# Patient Record
Sex: FEMALE | Race: WHITE | HISPANIC OR LATINO | Employment: UNEMPLOYED | URBAN - METROPOLITAN AREA
[De-identification: names, ages, dates, MRNs, and addresses within clinical notes are randomized per-mention and may not be internally consistent; named-entity substitution may affect disease eponyms.]

---

## 2017-09-27 ENCOUNTER — OFFICE VISIT (OUTPATIENT)
Dept: FAMILY MEDICINE | Facility: HOSPITAL | Age: 37
End: 2017-09-27
Attending: FAMILY MEDICINE

## 2017-09-27 VITALS
WEIGHT: 209.19 LBS | BODY MASS INDEX: 35.71 KG/M2 | HEART RATE: 90 BPM | HEIGHT: 64 IN | DIASTOLIC BLOOD PRESSURE: 92 MMHG | SYSTOLIC BLOOD PRESSURE: 162 MMHG

## 2017-09-27 DIAGNOSIS — I10 ESSENTIAL HYPERTENSION: ICD-10-CM

## 2017-09-27 DIAGNOSIS — Z30.09 COUNSELING FOR BIRTH CONTROL, ORAL CONTRACEPTIVES: Primary | ICD-10-CM

## 2017-09-27 PROCEDURE — 99213 OFFICE O/P EST LOW 20 MIN: CPT | Performed by: FAMILY MEDICINE

## 2017-09-27 RX ORDER — FOLIC ACID 1 MG/1
1 TABLET ORAL DAILY
Qty: 90 TABLET | Refills: 3 | Status: SHIPPED | OUTPATIENT
Start: 2017-09-27 | End: 2017-11-21

## 2017-09-27 RX ORDER — NORGESTIMATE AND ETHINYL ESTRADIOL 7DAYSX3 LO
1 KIT ORAL DAILY
Qty: 90 TABLET | Refills: 3 | Status: SHIPPED | OUTPATIENT
Start: 2017-09-27 | End: 2017-10-12 | Stop reason: SDUPTHER

## 2017-09-27 RX ORDER — LISINOPRIL 10 MG/1
10 TABLET ORAL DAILY
Qty: 30 TABLET | Refills: 0 | Status: SHIPPED | OUTPATIENT
Start: 2017-09-27 | End: 2017-10-12 | Stop reason: DRUGHIGH

## 2017-09-27 RX ORDER — LOSARTAN POTASSIUM 25 MG/1
25 TABLET ORAL DAILY
COMMUNITY
End: 2017-09-27

## 2017-09-27 NOTE — PROGRESS NOTES
I assume primary medical responsibility for this patient, I have reviewed the case history, findings, diagnosis and treatment plan with the resident and agree that the care is reasonable and necessary. This service has been performed by a resident without the presence of a teaching physician under the primary care exception  Ladonna Espinal  9/27/2017

## 2017-09-27 NOTE — PROGRESS NOTES
Subjective:       Patient ID: Jane Murphy is a 36 y.o. female.    Chief Complaint: Medication Refill    HPI   35 yo female, w/ h/o HTN, presents for medication refill. Patient was switch from lisinopril to losartan by DOC. She states she did not like losartan and would like to get switched back. Her BP is 162/92 today. She is complaining about a HA. She states she gets HA whenever her BP is elevated. Denies sob, cp, diaphoresis, or blurriness of vision. She states she would like to get OCPs to prevent anymore pregnancy. She has one sexual partner. States she does have mild nausea.     Review of Systems   Constitutional: Negative for chills and fever.   HENT: Negative for congestion.    Respiratory: Negative for shortness of breath and wheezing.    Cardiovascular: Negative for chest pain, palpitations and leg swelling.   Gastrointestinal: Negative for abdominal pain, constipation, diarrhea, nausea and vomiting.   Genitourinary: Negative for dysuria.   Neurological: Negative for dizziness and headaches.       Objective:      Vitals:    09/27/17 1512   BP: (!) 162/92   Pulse: 90     Physical Exam   Constitutional: She is oriented to person, place, and time. No distress.   HENT:   Head: Normocephalic and atraumatic.   TTP frontal area. Has ttp on L side of her neck   Eyes: Conjunctivae are normal.   Neck: Neck supple. No JVD present.   Cardiovascular: Normal rate, regular rhythm and normal heart sounds.  Exam reveals no gallop and no friction rub.    No murmur heard.  Pulmonary/Chest: Effort normal and breath sounds normal. She has no wheezes. She has no rales.   Abdominal: Soft. Bowel sounds are normal. She exhibits no distension. There is no tenderness.   Musculoskeletal: She exhibits no edema.   Neurological: She is alert and oriented to person, place, and time.   Skin: Skin is warm and dry.   Psychiatric: She has a normal mood and affect. Her behavior is normal. Judgment and thought content normal.        Assessment:       1. Counseling for birth control, oral contraceptives    2. Essential hypertension        Plan:       Counseling for birth control, oral contraceptives  -     norgestimate-ethinyl estradiol (ORTHO TRI-CYCLEN LO) 0.18/0.215/0.25 mg-25 mcg tablet; Take 1 tablet by mouth once daily.  Dispense: 90 tablet; Refill: 3  -     folic acid (FOLVITE) 1 MG tablet; Take 1 tablet (1 mg total) by mouth once daily.  Dispense: 90 tablet; Refill: 3    Essential hypertension  - /92  -     lisinopril 10 MG tablet; Take 1 tablet (10 mg total) by mouth once daily.  Dispense: 30 tablet; Refill: 0      RTC in 2 weeks

## 2017-10-12 ENCOUNTER — OFFICE VISIT (OUTPATIENT)
Dept: FAMILY MEDICINE | Facility: HOSPITAL | Age: 37
End: 2017-10-12
Attending: FAMILY MEDICINE

## 2017-10-12 VITALS
WEIGHT: 209.19 LBS | DIASTOLIC BLOOD PRESSURE: 85 MMHG | HEIGHT: 64 IN | BODY MASS INDEX: 35.71 KG/M2 | HEART RATE: 113 BPM | SYSTOLIC BLOOD PRESSURE: 148 MMHG

## 2017-10-12 DIAGNOSIS — F41.9 ANXIETY: ICD-10-CM

## 2017-10-12 DIAGNOSIS — Z30.09 COUNSELING FOR BIRTH CONTROL, ORAL CONTRACEPTIVES: ICD-10-CM

## 2017-10-12 DIAGNOSIS — I10 ESSENTIAL HYPERTENSION: Primary | ICD-10-CM

## 2017-10-12 PROCEDURE — 99213 OFFICE O/P EST LOW 20 MIN: CPT

## 2017-10-12 RX ORDER — NORGESTIMATE AND ETHINYL ESTRADIOL 7DAYSX3 LO
1 KIT ORAL DAILY
Qty: 30 TABLET | Refills: 6 | Status: SHIPPED | OUTPATIENT
Start: 2017-10-12 | End: 2018-10-12

## 2017-10-12 RX ORDER — FLUOXETINE 10 MG/1
10 TABLET ORAL DAILY
Qty: 30 TABLET | Refills: 3 | Status: SHIPPED | OUTPATIENT
Start: 2017-10-12 | End: 2017-11-21

## 2017-10-12 RX ORDER — LISINOPRIL 20 MG/1
20 TABLET ORAL DAILY
Qty: 30 TABLET | Refills: 3 | Status: SHIPPED | OUTPATIENT
Start: 2017-10-12 | End: 2018-01-22 | Stop reason: SDUPTHER

## 2017-10-12 RX ORDER — HYDROXYZINE HYDROCHLORIDE 25 MG/1
25 TABLET, FILM COATED ORAL 3 TIMES DAILY PRN
Qty: 30 TABLET | Refills: 0 | Status: SHIPPED | OUTPATIENT
Start: 2017-10-12

## 2017-10-12 NOTE — PROGRESS NOTES
Subjective:       Patient ID: Jane Murphy is a 36 y.o. female.    Chief Complaint: Follow-up and Hypertension    HPI   37 y/o F with a PMH of HTN is here for f/u on HTN. Reports she checks her bp once or twice a week at [facility] and systolic usually in the 150's 160's.   Currently denies headache, dizziness, facial pain.  She does complain of anxiety and has a h/o depression treated back in [place].  Denies suicidal or homicidal ideations.  Does not remember what medication she took for depression in the past.       Review of Systems   Constitutional: Negative for chills and fever.   HENT: Negative for congestion.    Respiratory: Negative for apnea and chest tightness.    Cardiovascular: Positive for palpitations. Negative for chest pain.   Gastrointestinal: Negative for abdominal distention and abdominal pain.   Musculoskeletal: Negative for arthralgias.   Neurological: Negative for light-headedness and numbness.   Psychiatric/Behavioral: Negative for agitation and behavioral problems.       Objective:      Vitals:    10/12/17 1340   BP: (!) 148/85   Pulse: (!) 113     Physical Exam   Constitutional: She appears well-nourished.   Eyes: EOM are normal.   Neck: Normal range of motion.   Cardiovascular: Regular rhythm.    Tachycardic    Pulmonary/Chest: Effort normal and breath sounds normal.   Musculoskeletal: Normal range of motion.   Neurological: No cranial nerve deficit. Coordination normal.   Skin: Skin is warm.       Assessment:       1. Essential hypertension    2. Anxiety    3. Counseling for birth control, oral contraceptives        Plan:         Will increase bp medication.  She is tachycardic but I believe this is part of her anxiety.  Will start anxiety meds and reassess in 1 month.     Essential hypertension  -     lisinopril (PRINIVIL,ZESTRIL) 20 MG tablet; Take 1 tablet (20 mg total) by mouth once daily.  Dispense: 30 tablet; Refill: 3    Anxiety  -     fluoxetine 10 MG Tab; Take 1  tablet (10 mg total) by mouth once daily.  Dispense: 30 tablet; Refill: 3  -     hydrOXYzine HCl (ATARAX) 25 MG tablet; Take 1 tablet (25 mg total) by mouth 3 (three) times daily as needed for Anxiety.  Dispense: 30 tablet; Refill: 0    Counseling for birth control, oral contraceptives  -     norgestimate-ethinyl estradiol (ORTHO TRI-CYCLEN LO) 0.18/0.215/0.25 mg-25 mcg tablet; Take 1 tablet by mouth once daily.  Dispense: 30 tablet; Refill: 6      f/u in 1 month

## 2017-11-10 ENCOUNTER — OFFICE VISIT (OUTPATIENT)
Dept: FAMILY MEDICINE | Facility: HOSPITAL | Age: 37
End: 2017-11-10

## 2017-11-10 VITALS
WEIGHT: 209.44 LBS | DIASTOLIC BLOOD PRESSURE: 83 MMHG | HEART RATE: 94 BPM | SYSTOLIC BLOOD PRESSURE: 132 MMHG | HEIGHT: 64 IN | BODY MASS INDEX: 35.76 KG/M2

## 2017-11-10 DIAGNOSIS — I10 ESSENTIAL HYPERTENSION: Primary | ICD-10-CM

## 2017-11-10 PROCEDURE — 99213 OFFICE O/P EST LOW 20 MIN: CPT

## 2017-11-13 PROBLEM — F41.9 ANXIETY: Status: RESOLVED | Noted: 2017-10-12 | Resolved: 2017-11-13

## 2017-11-13 NOTE — PROGRESS NOTES
I assume primary medical responsibility for this patient, I have reviewed the case history, findings, diagnosis and treatment plan with the resident and agree that the care is reasonable and necessary. This service has been performed by a resident without the presence of a teaching physician under the primary care exception  Ladonna Espinal  11/13/2017

## 2017-11-13 NOTE — PROGRESS NOTES
"Subjective:       Patient ID: Jane Murphy is a 37 y.o. female.    Chief Complaint: Follow-up    HPI   36 y/o F with a PMH HTN is here for a f/u on her anxiety.  Previous visit, patient reported anxiety symptoms and was prescribed atarax PRN and fluoxetine.  Patient reports she took 1 pill of fluoxetine and didn't like how she felt "somnolent."  Reports she has not had anxiety or depression symptoms.  Denies palpitations, nervous, insomnia.  Reports since she's been on her bp medications, her anxiety has resolved.      Review of Systems  as above  Objective:      Vitals:    11/10/17 1053   BP: 132/83   Pulse: 94     Physical Exam   Constitutional: She appears well-nourished. No distress.   Obese female    HENT:   Head: Atraumatic.   Eyes: EOM are normal.   Neck: Normal range of motion.   Cardiovascular: Normal rate.    Pulmonary/Chest: Effort normal.   Abdominal: Soft. Bowel sounds are normal.   Musculoskeletal: Normal range of motion.   Neurological: She is alert.   Skin: Skin is warm.       Assessment:       1. Essential hypertension        Plan:       Essential hypertension  -continue lisinopril    D/c fluoxetine and atarax as patient reports she's feeling much better    Return if symptoms worsen or fail to improve.      "

## 2017-11-20 ENCOUNTER — HOSPITAL ENCOUNTER (EMERGENCY)
Facility: HOSPITAL | Age: 37
Discharge: HOME | End: 2017-11-20
Attending: EMERGENCY MEDICINE

## 2017-11-20 VITALS
BODY MASS INDEX: 41.03 KG/M2 | DIASTOLIC BLOOD PRESSURE: 69 MMHG | RESPIRATION RATE: 20 BRPM | SYSTOLIC BLOOD PRESSURE: 120 MMHG | HEIGHT: 60 IN | OXYGEN SATURATION: 99 % | HEART RATE: 98 BPM | TEMPERATURE: 98 F | WEIGHT: 209 LBS

## 2017-11-20 DIAGNOSIS — R07.9 CHEST PAIN: ICD-10-CM

## 2017-11-20 DIAGNOSIS — R00.0 TACHYCARDIA: Primary | ICD-10-CM

## 2017-11-20 LAB
ALBUMIN SERPL BCP-MCNC: 3.5 G/DL
ALP SERPL-CCNC: 95 U/L
ALT SERPL W/O P-5'-P-CCNC: 20 U/L
ANION GAP SERPL CALC-SCNC: 7 MMOL/L
AST SERPL-CCNC: 19 U/L
B-HCG UR QL: NEGATIVE
BASOPHILS # BLD AUTO: 0.02 K/UL
BASOPHILS NFR BLD: 0.2 %
BILIRUB SERPL-MCNC: 0.3 MG/DL
BUN SERPL-MCNC: 8 MG/DL
CALCIUM SERPL-MCNC: 8.9 MG/DL
CHLORIDE SERPL-SCNC: 106 MMOL/L
CO2 SERPL-SCNC: 23 MMOL/L
CREAT SERPL-MCNC: 0.8 MG/DL
CTP QC/QA: YES
D DIMER PPP IA.FEU-MCNC: <0.19 MG/L FEU
DIFFERENTIAL METHOD: ABNORMAL
EOSINOPHIL # BLD AUTO: 0.2 K/UL
EOSINOPHIL NFR BLD: 2.1 %
ERYTHROCYTE [DISTWIDTH] IN BLOOD BY AUTOMATED COUNT: 15.5 %
EST. GFR  (AFRICAN AMERICAN): >60 ML/MIN/1.73 M^2
EST. GFR  (NON AFRICAN AMERICAN): >60 ML/MIN/1.73 M^2
GLUCOSE SERPL-MCNC: 113 MG/DL
HCT VFR BLD AUTO: 38.2 %
HGB BLD-MCNC: 12.3 G/DL
LYMPHOCYTES # BLD AUTO: 2.6 K/UL
LYMPHOCYTES NFR BLD: 30.2 %
MCH RBC QN AUTO: 26.5 PG
MCHC RBC AUTO-ENTMCNC: 32.2 G/DL
MCV RBC AUTO: 82 FL
MONOCYTES # BLD AUTO: 0.7 K/UL
MONOCYTES NFR BLD: 8.7 %
NEUTROPHILS # BLD AUTO: 5 K/UL
NEUTROPHILS NFR BLD: 58.4 %
PLATELET # BLD AUTO: 409 K/UL
PMV BLD AUTO: 9.3 FL
POTASSIUM SERPL-SCNC: 4.3 MMOL/L
PROT SERPL-MCNC: 7.9 G/DL
RBC # BLD AUTO: 4.65 M/UL
SODIUM SERPL-SCNC: 136 MMOL/L
T4 FREE SERPL-MCNC: 1.01 NG/DL
TROPONIN I SERPL DL<=0.01 NG/ML-MCNC: <0.006 NG/ML
TSH SERPL DL<=0.005 MIU/L-ACNC: 0.22 UIU/ML
WBC # BLD AUTO: 8.53 K/UL

## 2017-11-20 PROCEDURE — 96374 THER/PROPH/DIAG INJ IV PUSH: CPT

## 2017-11-20 PROCEDURE — 93010 ELECTROCARDIOGRAM REPORT: CPT | Mod: ,,, | Performed by: INTERNAL MEDICINE

## 2017-11-20 PROCEDURE — 85025 COMPLETE CBC W/AUTO DIFF WBC: CPT

## 2017-11-20 PROCEDURE — 93005 ELECTROCARDIOGRAM TRACING: CPT

## 2017-11-20 PROCEDURE — 25000003 PHARM REV CODE 250: Performed by: EMERGENCY MEDICINE

## 2017-11-20 PROCEDURE — 84443 ASSAY THYROID STIM HORMONE: CPT

## 2017-11-20 PROCEDURE — 81025 URINE PREGNANCY TEST: CPT | Performed by: EMERGENCY MEDICINE

## 2017-11-20 PROCEDURE — 96361 HYDRATE IV INFUSION ADD-ON: CPT

## 2017-11-20 PROCEDURE — 63600175 PHARM REV CODE 636 W HCPCS: Performed by: EMERGENCY MEDICINE

## 2017-11-20 PROCEDURE — 85379 FIBRIN DEGRADATION QUANT: CPT

## 2017-11-20 PROCEDURE — 80053 COMPREHEN METABOLIC PANEL: CPT

## 2017-11-20 PROCEDURE — 84484 ASSAY OF TROPONIN QUANT: CPT

## 2017-11-20 PROCEDURE — 99285 EMERGENCY DEPT VISIT HI MDM: CPT | Mod: 25

## 2017-11-20 PROCEDURE — 96375 TX/PRO/DX INJ NEW DRUG ADDON: CPT

## 2017-11-20 PROCEDURE — 84439 ASSAY OF FREE THYROXINE: CPT

## 2017-11-20 RX ORDER — KETOROLAC TROMETHAMINE 30 MG/ML
30 INJECTION, SOLUTION INTRAMUSCULAR; INTRAVENOUS
Status: COMPLETED | OUTPATIENT
Start: 2017-11-20 | End: 2017-11-20

## 2017-11-20 RX ORDER — LABETALOL HYDROCHLORIDE 5 MG/ML
10 INJECTION, SOLUTION INTRAVENOUS
Status: COMPLETED | OUTPATIENT
Start: 2017-11-20 | End: 2017-11-20

## 2017-11-20 RX ORDER — ONDANSETRON 2 MG/ML
4 INJECTION INTRAMUSCULAR; INTRAVENOUS
Status: COMPLETED | OUTPATIENT
Start: 2017-11-20 | End: 2017-11-20

## 2017-11-20 RX ADMIN — LABETALOL HYDROCHLORIDE 10 MG: 5 INJECTION, SOLUTION INTRAVENOUS at 09:11

## 2017-11-20 RX ADMIN — KETOROLAC TROMETHAMINE 30 MG: 30 INJECTION, SOLUTION INTRAMUSCULAR at 07:11

## 2017-11-20 RX ADMIN — SODIUM CHLORIDE 1000 ML: 0.9 INJECTION, SOLUTION INTRAVENOUS at 08:11

## 2017-11-20 RX ADMIN — ONDANSETRON 4 MG: 2 INJECTION INTRAMUSCULAR; INTRAVENOUS at 09:11

## 2017-11-20 NOTE — ED NOTES
PAtient has nausea, vomiting, and became tachycardic.  Left chest worsens with deep breathing.  Dr. Moreland notified.

## 2017-11-20 NOTE — ED NOTES
Patient has verified the spelling of their name and  on armband  LOC: The patient is awake, alert, and aware of environment with an appropriate affect, the patient is oriented x 3 and speaking appropriately.   APPEARANCE: Patient resting comfortably and in no acute distress, patient is clean and well groomed, patient's clothing is properly fastened.   SKIN: The skin is warm and dry, color consistent with ethnicity, patient has normal skin turgor and moist mucus membranes, skin intact, no breakdown or bruising noted.   :   Voids without difficulty  MUSCULOSKELETAL: Patient moving all extremities spontaneously, no obvious swelling or deformities noted.   RESPIRATORY: Airway is open and patent, respirations are spontaneous, patient has a normal effort and rate, no accessory muscle use noted, bilateral breath sounds clear, denies SOB   ABDOMEN: Soft and non tender to palpation, no distention noted, normoactive bowel sounds present in all four quadrants.   CARDIAC:  sinustach rate and rhythm, no peripheral edema noted, less then 3 second capillary refill, chest pain  COMPLAINT:  Left side of chest wall pain began at 0500 this morning, rates pain 3 out of 10, nausea

## 2017-11-20 NOTE — ED PROVIDER NOTES
Encounter Date: 11/20/2017       History     Chief Complaint   Patient presents with    Chest Pain     Patient presents to the ED via EMS with reports of having chest pain that woke her from her sleep. States pain worse with respirations. Unable to reproduce pain with palpation to the chest. Symptoms includes nausea but denies any vomiting.      The history is provided by the patient. The history is limited by a language barrier. A  was used.   Chest Pain   The current episode started 2 to 3 hours ago. Chest pain occurs constantly. The chest pain is unchanged. At its most intense, the chest pain is at 10/10. The quality of the pain is described as sharp. The pain does not radiate. Chest pain is worsened by deep breathing. Primary symptoms include nausea. Pertinent negatives for primary symptoms include no fever, no shortness of breath, no cough, no vomiting and no dizziness.   Pertinent negatives for associated symptoms include no diaphoresis. She tried nothing for the symptoms.   Her past medical history is significant for hypertension.     Review of patient's allergies indicates:   Allergen Reactions    Sulfa (sulfonamide antibiotics)      Past Medical History:   Diagnosis Date    Hypertension      Past Surgical History:   Procedure Laterality Date    CHOLECYSTECTOMY      2006 in [place]     Family History   Problem Relation Age of Onset    Hypertension Father     Diabetes Mother     Hypertension Mother      Social History   Substance Use Topics    Smoking status: Never Smoker    Smokeless tobacco: Never Used    Alcohol use No     Review of Systems   Constitutional: Negative for diaphoresis and fever.   Respiratory: Negative for cough and shortness of breath.    Cardiovascular: Positive for chest pain. Negative for leg swelling.   Gastrointestinal: Positive for nausea. Negative for vomiting.   Musculoskeletal:        No calf pain.   Neurological: Negative for dizziness.   All other  systems reviewed and are negative.      Physical Exam     Initial Vitals [11/20/17 0642]   BP Pulse Resp Temp SpO2   (!) 142/84 (!) 116 20 98.4 °F (36.9 °C) 99 %      MAP       103.33         Physical Exam    Nursing note and vitals reviewed.  Constitutional: She appears well-developed and well-nourished.   HENT:   Head: Normocephalic and atraumatic.   Eyes: Conjunctivae and EOM are normal. Pupils are equal, round, and reactive to light.   Neck: Normal range of motion. Neck supple.   Cardiovascular:   Tachycardic.   Pulmonary/Chest: Breath sounds normal.   Abdominal: Soft. There is no tenderness. There is no rebound and no guarding.   Musculoskeletal: Normal range of motion. She exhibits no tenderness.   Neurological: She is alert and oriented to person, place, and time. She has normal strength.   Skin: Skin is warm and dry.   Psychiatric: She has a normal mood and affect.         ED Course   Procedures  Labs Reviewed   CBC W/ AUTO DIFFERENTIAL - Abnormal; Notable for the following:        Result Value    MCH 26.5 (*)     RDW 15.5 (*)     Platelets 409 (*)     All other components within normal limits   COMPREHENSIVE METABOLIC PANEL - Abnormal; Notable for the following:     Glucose 113 (*)     Anion Gap 7 (*)     All other components within normal limits   TSH - Abnormal; Notable for the following:     TSH 0.222 (*)     All other components within normal limits   TROPONIN I   D DIMER, QUANTITATIVE   TSH   T4, FREE   POCT URINE PREGNANCY        Imaging Results          X-Ray Chest 1 View (Final result)  Result time 11/20/17 07:56:47    Final result by Macie Morataya MD (11/20/17 07:56:47)                 Impression:      No acute cardiopulmonary process identified on hypoventilatory examination.      Electronically signed by: MACIE MORATAYA  Date:     11/20/17  Time:    07:56              Narrative:    Comparison: 7/30/2016.    Technique: Single AP chest radiograph.    Findings:   Monitoring leads overlie the chest.  The cardiomediastinal silhouette is within normal limits.  The visualized airway is unremarkable.   There are low lung volumes bilaterally.  There is no definite evidence of focal alveolar consolidation, pneumothorax or pleural fluid.  Osseous structures demonstrate no acute abnormalities.                                 Medical Decision Making:   Clinical Tests:   Lab Tests: Ordered and Reviewed  Radiological Study: Ordered and Reviewed  ED Management:  37-year-old female who presents with pruritic chest pain and tachycardia.  Her d-dimer is within normal limits  and I do not believe the patient has a pulmonary embolism.  Remainder of workup is unremarkable.  Patient was seen and evaluated by the [facility] family practice resident.  She'll be discharged in stable condition and will be seen in the clinic tomorrow morning for further evaluation and treatment.                   ED Course      Clinical Impression:   The primary encounter diagnosis was Tachycardia. A diagnosis of Chest pain was also pertinent to this visit.                           Jesus Moreland MD  11/20/17 3575

## 2017-11-20 NOTE — ED NOTES
Comfort measures initiated. Call light with reach of pt. Will continue to monitor. Family member at bedside.

## 2017-11-21 ENCOUNTER — OFFICE VISIT (OUTPATIENT)
Dept: FAMILY MEDICINE | Facility: HOSPITAL | Age: 37
End: 2017-11-21
Attending: FAMILY MEDICINE

## 2017-11-21 VITALS
DIASTOLIC BLOOD PRESSURE: 87 MMHG | HEART RATE: 99 BPM | HEIGHT: 64 IN | BODY MASS INDEX: 35.71 KG/M2 | WEIGHT: 209.19 LBS | SYSTOLIC BLOOD PRESSURE: 142 MMHG

## 2017-11-21 DIAGNOSIS — F41.9 ANXIETY: ICD-10-CM

## 2017-11-21 DIAGNOSIS — R09.1 PLEURISY: Primary | ICD-10-CM

## 2017-11-21 PROCEDURE — 99213 OFFICE O/P EST LOW 20 MIN: CPT | Performed by: FAMILY MEDICINE

## 2017-11-21 RX ORDER — IBUPROFEN 800 MG/1
800 TABLET ORAL
Qty: 30 TABLET | Refills: 0 | Status: SHIPPED | OUTPATIENT
Start: 2017-11-21

## 2017-11-21 NOTE — PROGRESS NOTES
Subjective:       Patient ID: Jane Murphy is a 37 y.o. female.    Chief Complaint: Palpitations    HPI   37 year old female with a history of HTN and Palpitations that presents for hospital follow up. She was seen in the ED yesterday and was discharged with diagnosis of anxiety with palpitations. She states that she awoke with chest pain that was not radiating. She was having palpitations and anxiety. She denies any new stressors and states that she had a calm day on Sunday. She has a 15 month old at home. She denies any tobacco, etoh or illicit drug use. She had previously been given a rx for Fluoxetine by Dr. Yarbrough but thought that she could take the medicine PRN.     Review of Systems   Constitutional: Negative for fatigue and fever.   HENT: Negative for sore throat.    Eyes: Negative for visual disturbance.   Respiratory: Negative for shortness of breath.    Cardiovascular: Positive for palpitations (improved).   Gastrointestinal: Negative for diarrhea and vomiting.   Skin: Negative for rash.   Neurological: Negative for headaches.   Psychiatric/Behavioral: The patient is nervous/anxious.        Objective:      Vitals:    11/21/17 1015   BP: (!) 142/87   Pulse: 99     Physical Exam   Constitutional: She is oriented to person, place, and time. She appears well-developed and well-nourished.   HENT:   Head: Normocephalic and atraumatic.   Neck: Normal range of motion.   Cardiovascular: Normal rate and regular rhythm.    No murmur heard.  Pulmonary/Chest: Effort normal. No respiratory distress.   Abdominal: Soft. She exhibits no distension. There is no tenderness.   Musculoskeletal: Normal range of motion.   Neurological: She is alert and oriented to person, place, and time.   Skin: Skin is warm and dry. No rash noted.   Psychiatric: She has a normal mood and affect.       Assessment:       1. Pleurisy    2. Anxiety        Plan:       Pleurisy        -     Reproducible with deep inspiration  Anxiety         -     Pt will start on Fluoxetine daily        -     She has atarax as needed for anxiety  Other orders  -     ibuprofen (ADVIL,MOTRIN) 800 MG tablet; Take 1 tablet (800 mg total) by mouth 3 (three) times daily with meals.  Dispense: 30 tablet; Refill: 0  -     Lipid panel ordered to evaluate cardiovascular risk      Return in about 2 weeks (around 12/5/2017) for with Dr. Yarbrough.      Pt discussed with Dr. Marquis.    Mireya Kumar MD

## 2017-12-08 ENCOUNTER — OFFICE VISIT (OUTPATIENT)
Dept: FAMILY MEDICINE | Facility: HOSPITAL | Age: 37
End: 2017-12-08
Attending: FAMILY MEDICINE

## 2017-12-08 VITALS
RESPIRATION RATE: 20 BRPM | BODY MASS INDEX: 36.49 KG/M2 | SYSTOLIC BLOOD PRESSURE: 138 MMHG | HEART RATE: 91 BPM | WEIGHT: 205.94 LBS | HEIGHT: 63 IN | DIASTOLIC BLOOD PRESSURE: 86 MMHG

## 2017-12-08 DIAGNOSIS — E66.9 OBESITY (BMI 35.0-39.9 WITHOUT COMORBIDITY): ICD-10-CM

## 2017-12-08 DIAGNOSIS — Z83.3 FAMILY HISTORY OF DIABETES MELLITUS IN FIRST DEGREE RELATIVE: ICD-10-CM

## 2017-12-08 DIAGNOSIS — F41.9 ANXIETY: Primary | ICD-10-CM

## 2017-12-08 PROCEDURE — 99213 OFFICE O/P EST LOW 20 MIN: CPT

## 2017-12-08 NOTE — PROGRESS NOTES
Subjective:       Patient ID: Jane Murphy is a 37 y.o. female.    Chief Complaint: Follow-up    HPI   36 y/o F with anxiety presents for f/u on anxiety.  She went to the ED on 11/20/17 after she woke up with anxiety and palpitations.  Reports she gets worried about her son, since he had febrile seizures earlier this month.  She worries that her son will have more seizures is she's not checking his temperature.    She was started on prozac daily and atarax PRN and reports compliance.  She was educated to take prozac daily and and atarax PRN anxiety.    Currently she denies anxiety.     Review of Systems   Constitutional: Negative for activity change and appetite change.   HENT: Negative for congestion.    Respiratory: Negative for apnea and chest tightness.    Cardiovascular: Negative for chest pain.   Gastrointestinal: Negative for abdominal distention.   Musculoskeletal: Negative for arthralgias.   Skin: Negative for color change.   Neurological: Negative for dizziness and facial asymmetry.   Psychiatric/Behavioral: Negative for agitation, behavioral problems and self-injury.       Objective:      Vitals:    12/08/17 1120   BP: 138/86   Pulse: 91   Resp: 20     Physical Exam   Constitutional: She is oriented to person, place, and time. She appears well-nourished. No distress.   Obese female    HENT:   Head: Atraumatic.   Eyes: EOM are normal.   Neck: Normal range of motion.   Cardiovascular: Normal rate.    Pulmonary/Chest: Effort normal.   Abdominal: Soft.   Musculoskeletal: Normal range of motion.   Neurological: She is alert and oriented to person, place, and time.   Skin: Skin is warm.       Assessment:       1. Anxiety    2. Obesity (BMI 35.0-39.9 without comorbidity)    3. Family history of diabetes mellitus in first degree relative        Plan:       Anxiety  -continue daily prozac and PRN atarax.  Counseled that she does not have to check her son's temp if he's not sick and gave reassurance.      Obesity (BMI 35.0-39.9 without comorbidity)  -     Counseled to loose weight via diet and excessive        F/u in 6 wks for pap smear

## 2017-12-11 NOTE — PROGRESS NOTES
I assume primary medical responsibility for this patient, I have reviewed the case history, findings, diagnosis and treatment plan with the resident and agree that the care is reasonable and necessary. This service has been performed by a resident without the presence of a teaching physician under the primary care exception  Ladonna Espinal  12/11/2017

## 2018-01-22 ENCOUNTER — OFFICE VISIT (OUTPATIENT)
Dept: FAMILY MEDICINE | Facility: HOSPITAL | Age: 38
End: 2018-01-22
Attending: FAMILY MEDICINE

## 2018-01-22 VITALS
WEIGHT: 202.38 LBS | SYSTOLIC BLOOD PRESSURE: 134 MMHG | DIASTOLIC BLOOD PRESSURE: 82 MMHG | HEART RATE: 112 BPM | BODY MASS INDEX: 35.86 KG/M2 | HEIGHT: 63 IN

## 2018-01-22 DIAGNOSIS — I10 ESSENTIAL HYPERTENSION: Primary | ICD-10-CM

## 2018-01-22 PROCEDURE — 99213 OFFICE O/P EST LOW 20 MIN: CPT

## 2018-01-22 RX ORDER — LISINOPRIL 20 MG/1
20 TABLET ORAL DAILY
Qty: 30 TABLET | Refills: 11 | Status: SHIPPED | OUTPATIENT
Start: 2018-01-22 | End: 2019-01-22

## 2018-01-22 NOTE — PROGRESS NOTES
"Subjective:       Patient ID: Jane Murphy is a 37 y.o. female.    Chief Complaint: Follow-up    HPI   36 y/o F with a PMH anxiety, HTN present scheduled for a pap smear today.  Patient reports she's not ready for a pap smear and would like to see a female patient.    She was diagnosed with anxiety and prescribed prozac qd and atarax daily but patient reports she's not taking either medication because she "feels much better."        Review of Systems  denies depression, anxiety, palpitations, fever, chills,n/v/d  Objective:      Vitals:    01/22/18 1425   BP: 134/82   Pulse: (!) 112            92@1503     Physical Exam    GEN: obese female in NAD  HEENT: MMM  CV: RRR  PULM: CTA-B  GI: S/NT/ND  EXT: No edema   Assessment:       1. Essential hypertension        Plan:       Essential hypertension  -     lisinopril (PRINIVIL,ZESTRIL) 20 MG tablet; Take 1 tablet (20 mg total) by mouth once daily.  Dispense: 30 tablet; Refill: 11    Patient with anxiety non-compliant with meds.  Counseled the importance of med compliance.    Patient was scheduled for a pap today but wants a female physician.  Will accommodate with another scheduled apt with a female physician.      F/u PRN         "

## 2020-07-14 ENCOUNTER — LAB VISIT (OUTPATIENT)
Dept: PRIMARY CARE CLINIC | Facility: OTHER | Age: 40
End: 2020-07-14
Attending: INTERNAL MEDICINE
Payer: OTHER GOVERNMENT

## 2020-07-14 DIAGNOSIS — Z03.818 ENCOUNTER FOR OBSERVATION FOR SUSPECTED EXPOSURE TO OTHER BIOLOGICAL AGENTS RULED OUT: ICD-10-CM

## 2020-07-14 PROCEDURE — U0003 INFECTIOUS AGENT DETECTION BY NUCLEIC ACID (DNA OR RNA); SEVERE ACUTE RESPIRATORY SYNDROME CORONAVIRUS 2 (SARS-COV-2) (CORONAVIRUS DISEASE [COVID-19]), AMPLIFIED PROBE TECHNIQUE, MAKING USE OF HIGH THROUGHPUT TECHNOLOGIES AS DESCRIBED BY CMS-2020-01-R: HCPCS

## 2020-07-18 LAB — SARS-COV-2 RNA RESP QL NAA+PROBE: DETECTED

## 2022-01-10 ENCOUNTER — LAB VISIT (OUTPATIENT)
Dept: PRIMARY CARE CLINIC | Facility: CLINIC | Age: 42
End: 2022-01-10
Payer: OTHER GOVERNMENT

## 2022-01-10 DIAGNOSIS — Z20.822 CONTACT WITH AND (SUSPECTED) EXPOSURE TO COVID-19: ICD-10-CM

## 2022-01-10 LAB
CTP QC/QA: YES
SARS-COV-2 AG RESP QL IA.RAPID: NEGATIVE

## 2022-01-10 PROCEDURE — 87811 SARS-COV-2 COVID19 W/OPTIC: CPT
